# Patient Record
(demographics unavailable — no encounter records)

---

## 2019-04-22 NOTE — NUR
BIB SELF. AAO X4. C/O PERSISTENT DIZZINESS, N/V X 1 WEEK S/P HEAD INJURY 2 
WEEKS AGO. PER PT SLIPPED AND FELL BACK, HEAD ONTO CONCRETE POSSIBLE 
KO---PROGRESSIVELY WORSENING HEADACHE AND DIZZINESS. FULL CLEAR SPEECH. FACIAL 
SYMMETRY, PERRLA BRISK 3MM. EQUAL JACIEL STRENGTH TO UPPER AND LOWER EXTREMITIES. 
HOB UP. BED SIDE RAILS UP X1. ON LOW BED POSITION, LOCKED. MD ER MADE AWARE OF 
PT STATUS.